# Patient Record
Sex: FEMALE | Race: WHITE | NOT HISPANIC OR LATINO | URBAN - METROPOLITAN AREA
[De-identification: names, ages, dates, MRNs, and addresses within clinical notes are randomized per-mention and may not be internally consistent; named-entity substitution may affect disease eponyms.]

---

## 2017-01-01 ENCOUNTER — HOME CARE/HOSPICE - HEALTHEAST (OUTPATIENT)
Dept: HOSPICE | Facility: HOSPICE | Age: 82
End: 2017-01-01

## 2017-01-01 ENCOUNTER — OFFICE VISIT - HEALTHEAST (OUTPATIENT)
Dept: GERIATRICS | Facility: CLINIC | Age: 82
End: 2017-01-01

## 2017-01-01 ENCOUNTER — AMBULATORY - HEALTHEAST (OUTPATIENT)
Dept: GERIATRICS | Facility: CLINIC | Age: 82
End: 2017-01-01

## 2017-01-01 DIAGNOSIS — F32.A DEPRESSION: ICD-10-CM

## 2017-01-01 DIAGNOSIS — I50.42 CHRONIC COMBINED SYSTOLIC AND DIASTOLIC CONGESTIVE HEART FAILURE (H): ICD-10-CM

## 2017-01-01 DIAGNOSIS — G30.1 LATE ONSET ALZHEIMER'S DISEASE WITH BEHAVIORAL DISTURBANCE (H): ICD-10-CM

## 2017-01-01 DIAGNOSIS — J44.9 CHRONIC AIRWAY OBSTRUCTION (H): ICD-10-CM

## 2017-01-01 DIAGNOSIS — I48.20 CHRONIC ATRIAL FIBRILLATION (H): ICD-10-CM

## 2017-01-01 DIAGNOSIS — I10 ESSENTIAL HYPERTENSION WITH GOAL BLOOD PRESSURE LESS THAN 140/90: ICD-10-CM

## 2017-01-01 DIAGNOSIS — F03.90 DEMENTIA (H): ICD-10-CM

## 2017-01-01 DIAGNOSIS — F02.818 LATE ONSET ALZHEIMER'S DISEASE WITH BEHAVIORAL DISTURBANCE (H): ICD-10-CM

## 2017-01-01 DIAGNOSIS — B35.4 TINEA CORPORIS: ICD-10-CM

## 2017-01-01 DIAGNOSIS — L71.9 ACNE ROSACEA: ICD-10-CM

## 2017-01-01 DIAGNOSIS — R29.6 FALLS: ICD-10-CM

## 2017-01-01 DIAGNOSIS — H40.9 GLAUCOMA, UNSPECIFIED GLAUCOMA, UNSPECIFIED LATERALITY: ICD-10-CM

## 2017-01-01 RX ORDER — LORAZEPAM 0.5 MG/1
0.5 TABLET ORAL 3 TIMES DAILY
Status: SHIPPED | COMMUNITY
Start: 2017-01-01

## 2017-01-01 RX ORDER — LORAZEPAM 0.5 MG/1
0.5-2 TABLET ORAL EVERY 4 HOURS PRN
Status: SHIPPED | COMMUNITY
Start: 2017-01-01

## 2017-01-01 ASSESSMENT — MIFFLIN-ST. JEOR: SCORE: 1019.58

## 2021-05-30 VITALS — WEIGHT: 170.8 LBS

## 2021-05-30 VITALS — WEIGHT: 176.4 LBS

## 2021-05-30 VITALS — WEIGHT: 176 LBS

## 2021-05-30 VITALS — WEIGHT: 171.2 LBS

## 2021-05-31 VITALS — BODY MASS INDEX: 34 KG/M2 | HEIGHT: 58 IN | WEIGHT: 162 LBS

## 2021-05-31 VITALS — WEIGHT: 166.5 LBS

## 2021-05-31 VITALS — WEIGHT: 166.8 LBS

## 2021-05-31 VITALS — WEIGHT: 168.5 LBS

## 2021-05-31 VITALS — WEIGHT: 161.2 LBS

## 2021-06-02 ENCOUNTER — RECORDS - HEALTHEAST (OUTPATIENT)
Dept: ADMINISTRATIVE | Facility: CLINIC | Age: 86
End: 2021-06-02

## 2021-06-08 NOTE — PROGRESS NOTES
"[facility]    Facility:   [facility] [[id number]]   Code Status: DNR/DNI      CHIEF COMPLAINT/REASON FOR VISIT:  Chief Complaint   Patient presents with     Review Of Multiple Medical Conditions       HISTORY:      HPI: Lucía is a 89 y.o. female with advanced dementia residing at [facility]. I met with her today in her room for a routine follow-up. She was alert and pleasant. She was not oriented to self. Nursing report she eats well and ambulates with a walker. She did not understand the word  pain when I asked her but when I asked if anything hurt she responded, \"no\".She was found to have a yeast rash under breast, axilla and abdominal folds that is currently being treated with miconazole nitrate. Nursing report it is not getting worse.    Past Medical History   Diagnosis Date     Atrial fibrillation      CHF (congestive heart failure)      Chronic airway obstruction      Dementia      Depression      Glaucoma      Hyperlipemia      Hypertension              No family history on file.  Social History     Social History     Marital status:      Spouse name: N/A     Number of children: N/A     Years of education: N/A     Occupational History     Retired Nurse      Social History Main Topics     Smoking status: Never Smoker     Smokeless tobacco: Not on file     Alcohol use No     Drug use: No     Sexual activity: Not on file     Other Topics Concern     Not on file     Social History Narrative    Moved from [facility] Memory care Unit, Now in SCU on LTC status  12/2015         Review of Systems  Unable to obtain- Pt not even alert to self  .  Vitals:    01/20/17 0943   BP: 110/58   Pulse: 73   Resp: 22   Temp: 98.6  F (37  C)   Weight: 176 lb (79.8 kg)       Physical Exam   Constitutional: She appears well-nourished.   HENT:   Head: Normocephalic.   Eyes: Conjunctivae are normal.   Neck: Normal range of motion.   Cardiovascular: Normal rate, regular " rhythm and normal heart sounds.    No murmur heard.  Pulmonary/Chest: She has no wheezes. She has no rales.   Abdominal: Soft. Bowel sounds are normal. She exhibits no distension. There is no tenderness.   Musculoskeletal: She exhibits no edema.   Skin: Skin is warm and dry. Rash noted.   Yeast rash under breast, axilla and abdominal folds, currently being treated.   Psychiatric: Her behavior is normal.   Patient pleasant but could not tell me her name and did not follow commands.         LABS:   11/10/16  creatinine 1.22  GFR 41  TSH 3.84 on 10/29/16  BMP WNL    ASSESSMENT:      ICD-10-CM    1. Dementia F03.90    2. Chronic airway obstruction J44.9    3. Chronic combined systolic and diastolic congestive heart failure I50.42    4. Depression F32.9    5. Essential hypertension with goal blood pressure less than 140/90 I10        PLAN:    Patient is doing well. We will continue with the antifungal medication to abdominal fold, under breast and axilla until healed. Resident was alert and not even oriented to herself. Nursing have no concerns and report she eats well. No further changes to care plan.        Electronically signed by: Reba Muro CNP

## 2021-06-08 NOTE — PROGRESS NOTES
[facility]    DATE: 2017    NAME: Lucía Sr  : 1925           MR# [id number]     CODE STATUS:  DNR/DNI      VISIT TYPE: Problem   FACILITY: [facility] [[id number]]    ROOM: 312    PRIMARY CARE PROVIDER: Malia Leroy CNP Phone: [phone] Fax:[phone]    History of Present Illness:   Lucía Sr is a 89 y.o. female with  advanced dementia And hearing loss. She is at best poorly cooperative allowing only minimal examinations and a difficult person to agree to bathing or showering. Today I find her at rest waiting for her lunch. She allows me a very cursory exam although she suspicious of my motivations. Her responses are not pertinent    Past Medical History:  Past Medical History   Diagnosis Date     Atrial fibrillation      CHF (congestive heart failure)      Chronic airway obstruction      Dementia      Depression      Glaucoma      Hyperlipemia      Hypertension        Allergies:  No Known Allergies    Current Medications:  Current Outpatient Prescriptions   Medication Sig     acetaminophen (TYLENOL) 325 MG tablet Take 650 mg by mouth 2 (two) times a day as needed for pain (not to exceed 4,000 mg in 24 hours.).      acetaminophen (TYLENOL) 325 MG tablet Take 650 mg by mouth 2 (two) times a day.     albuterol (PROVENTIL) 2.5 mg /3 mL (0.083 %) nebulizer solution Take 2.5 mg by nebulization 3 (three) times a day as needed for wheezing or shortness of breath.      aspirin 325 MG tablet Take 325 mg by mouth daily.      bisacodyl (DULCOLAX) 10 mg suppository Insert 10 mg into the rectum daily as needed.     citalopram (CELEXA) 20 MG tablet Take 10 mg by mouth daily.      furosemide (LASIX) 20 MG tablet Take 60 mg by mouth every morning.      furosemide (LASIX) 20 MG tablet Take 40 mg by mouth Daily at 1:00pm.      hydrocortisone 1 % cream Apply topically 2 (two) times a day. Apply to rash area along neck BID until healed and then BID PRN      lisinopril (PRINIVIL,ZESTRIL) 20 MG tablet Take 20 mg by mouth daily.      miconazole nitrate (CRITIC-AID AF) 2 % Oint ointment Apply 1 application topically 2 (two) times a day. Aply to breast folds, Axillary, and abdominal folds. Clean well between applications, apply 4x4     potassium chloride SA (K-DUR,KLOR-CON) 20 MEQ tablet Take 20 mEq by mouth daily. May dissolve in small amount of water to dispense     QUEtiapine (SEROQUEL) 25 MG tablet Take 12.5 mg by mouth daily.      traZODone (DESYREL) 50 MG tablet Take 75 mg by mouth bedtime.        Review of Systems:  History obtained from chart review and unobtainable from patient due to mental status  Respiratory ROS: no cough, shortness of breath, or wheezing  Cardiovascular ROS: no chest pain or dyspnea on exertion  Gastrointestinal ROS: no abdominal pain, change in bowel habits, or black or bloody stools  Genito-Urinary ROS: no dysuria, trouble voiding, or hematuria  Musculoskeletal ROS: Nursing services report she just robes and present yourself to the dayroom occasionally  Neurological ROS: no TIA or stroke symptoms  Dermatological ROS: nursing service reports that their ongoing efforts for bathing have disclosed no pressure ulcers          Laboratory:  No results for input(s): INR in the last 72 hours.       Physical Examination:  Visit Vitals     /74     Pulse 68     Temp 98.4  F (36.9  C)     Resp 22     Wt 176 lb 6.4 oz (80 kg)     SpO2 94%     General appearance: alert, appears stated age, cooperative, distracted, fatigued, no distress, moderately obese and slowed mentation  Lungs: clear to auscultation bilaterally  Heart: regular rate and rhythm, S1, S2 normal, no murmur, click, rub or gallop  Abdomen: soft, non-tender; bowel sounds normal; no masses,  no organomegaly  Remainder of the exam was aborted by patients commands    Impression:  Lucía Sr is a 89 y.o. female with Advancing dementia    1. Dementia     2. Chronic airway obstruction      3. Chronic combined systolic and diastolic congestive heart failure     4. Acne rosacea     5. Essential hypertension with goal blood pressure less than 140/90     6. Chronic atrial fibrillation     7. Glaucoma, unspecified glaucoma, unspecified laterality     8. Tinea corporis         Plan: Will continue to approach pieces of her exam as time goes on. She feels safe and comfortable is obviously eating well and I believe well protected from outside influences at this point    Electronically signed by: José Miguel Cabrera Sr., MD

## 2021-06-09 NOTE — PROGRESS NOTES
Date: 17    Name:  Lucía Sr  :  1925  MRN: [id number]  Code Status:  DNR/DNI    Visit Type: Problem Visit     Facility:  [facility] [[id number]]      History of Present Illness:   This is a 89-year-old female here at the diagnosis of dementia with psychosis. She lives in a locked dementia unit here on the third floor. She was admitted secondary to falls with weakness-with  No recent falls noted or reported. Staff reports resident was sent to ER - after an injury to her lower right leg. She obtained a skin tear and did have stitches and was sent back to this unit. Staff reports the area is deep pink and request a  visit to assess the need for further treatment.  Resident does use a wheelchair for mobility. She is hard of hearing and is not a reliable - nor does she answer to many questions.. She does have episodes of anxiety with behaviors. She at times is difficult to redirect. She's followed by psych recovery services.  She's here the diagnosis of hypertension with her systolic pressures and pulses noted below in fair range. Also the diagnosis CHF which has been compensated and COPD that's also been stable. She has treated for skin issues since admission- no reports today of any issues or concerns.        Past Medical History:   Diagnosis Date     Atrial fibrillation      CHF (congestive heart failure)      Chronic airway obstruction      Dementia      Depression      Glaucoma      Hyperlipemia      Hypertension      No past surgical history on file.  No family history on file.  Social History   Substance Use Topics     Smoking status: Never Smoker     Smokeless tobacco: Not on file     Alcohol use No       Medications:  Current Outpatient Prescriptions   Medication Sig Dispense Refill     acetaminophen (TYLENOL) 325 MG tablet Take 650 mg by mouth 2 (two) times a day as needed for pain (not to exceed 4,000 mg in 24 hours.).        acetaminophen (TYLENOL) 325 MG tablet  Take 650 mg by mouth 2 (two) times a day.       albuterol (PROVENTIL) 2.5 mg /3 mL (0.083 %) nebulizer solution Take 2.5 mg by nebulization 3 (three) times a day as needed for wheezing or shortness of breath.        aspirin 325 MG tablet Take 325 mg by mouth daily.        bisacodyl (DULCOLAX) 10 mg suppository Insert 10 mg into the rectum daily as needed.       citalopram (CELEXA) 20 MG tablet Take 10 mg by mouth daily.        furosemide (LASIX) 20 MG tablet Take 60 mg by mouth every morning.        furosemide (LASIX) 20 MG tablet Take 40 mg by mouth daily. At noon       lisinopril (PRINIVIL,ZESTRIL) 20 MG tablet Take 20 mg by mouth daily.        potassium chloride SA (K-DUR,KLOR-CON) 20 MEQ tablet Take 20 mEq by mouth daily. May dissolve in small amount of water to dispense       QUEtiapine (SEROQUEL) 25 MG tablet Take 12.5 mg by mouth daily.        traZODone (DESYREL) 50 MG tablet Take 75 mg by mouth bedtime.        No current facility-administered medications for this visit.          No Known Allergies    Vital Signs:   Blood pressure 118/62 pulse 90 respirations 20 afebrile weight 176.9 pounds    ROS:   Resident denies any uncontrolled pain, is not a good - smiles a lot today. Staff has no reports of any issues or concerns regard to her comfort level.      PHYSICAL EXAM:  General; this is an alert obese female up in her wheelchair in no apparent distress    HEENT:Normocephalic/atraumatic Conjunctivae without erythema nares are clear of any drainage.  Neck: No adenopathy JVD thyromegaly  Resp: Clear No rhonchi wheezing rales  Cardio: Regular rate and rhythm No murmurs appreciated  Abdomen: Soft nontender no masses distention bowel sounds are present.  Extremities: 2+ lower extremity edema fair peripheral pulses  Skin: Right lower leg (outer aspect) large sutured skin tear- 3-4 cm in diameter- with deep pink edges noted.  : N/A  Musculoskeletal: Age-related degenerative joint disease  Psych: alert  oriented to self- strikes out when approached    Labs:   No visits with results within 1 Month(s) from this visit.  Latest known visit with results is:    Lab Requisition on 11/10/2016   Component Date Value Ref Range Status     Creatinine 11/10/2016 1.22* 0.60 - 1.10 mg/dL Final     GFR MDRD Af Amer 11/10/2016 50* >60 mL/min/1.73m2 Final     GFR MDRD Non Af Amer 11/10/2016 41* >60 mL/min/1.73m2 Final        Diagnoses:    ICD-10-CM    1. Laceration T14.8    2. Depression F32.9    3. Dementia F03.90    4. Falls W19.XXXA        Plan: We will order Keflex 500 mg bid for 7 days- give yogurt bid for 2 weeks. Ok for bacitracin to apply to wound area with dressing changes. Call with update PRN.    Electronically signed by: Malia Leroy, DARBY

## 2021-06-09 NOTE — PROGRESS NOTES
[facility]    Facility:   [facility] [[id number]]   Code Status: DNR/DNI      CHIEF COMPLAINT/REASON FOR VISIT:  Chief Complaint   Patient presents with     Review Of Multiple Medical Conditions       HISTORY:      HPI: Lucía is a 89 y.o. female with advanced dementia residing at [facility]. I met with her today in her room for a routine follow-up. She was alert and pleasant. She was not oriented to self. Nursing report she eats well and ambulates with a walker. Nursing took her leg dressing so that I could assess her  wound (see physical exam).      Past Medical History:   Diagnosis Date     Atrial fibrillation      CHF (congestive heart failure)      Chronic airway obstruction      Dementia      Depression      Glaucoma      Hyperlipemia      Hypertension              No family history on file.  Social History     Social History     Marital status:      Spouse name: N/A     Number of children: N/A     Years of education: N/A     Occupational History     Retired Nurse      Social History Main Topics     Smoking status: Never Smoker     Smokeless tobacco: Not on file     Alcohol use No     Drug use: No     Sexual activity: Not on file     Other Topics Concern     Not on file     Social History Narrative    Moved from [facility] Memory care Unit, Now in SCU on LTC status  12/2015         Review of Systems   Constitutional: Negative for appetite change and fever.   HENT: Negative for congestion.    Respiratory: Negative for cough and shortness of breath.    Cardiovascular: Negative for leg swelling.   Gastrointestinal: Negative for constipation and diarrhea.   Psychiatric/Behavioral: Negative for sleep disturbance.     Obtained from nursing and chart  .  Vitals:    03/09/17 0906   BP: 116/68   Pulse: 75   Resp: 16   Temp: 97.8  F (36.6  C)   SpO2: 94%   Weight: 171 lb 3.2 oz (77.7 kg)       Physical Exam   Constitutional: She appears well-nourished.    HENT:   Head: Normocephalic.   Eyes: Conjunctivae are normal.   Neck: Normal range of motion.   Cardiovascular: Normal rate, regular rhythm and normal heart sounds.    No murmur heard.  Pulmonary/Chest: She has no wheezes. She has no rales.   Abdominal: Soft. Bowel sounds are normal. She exhibits no distension. There is no tenderness.   Musculoskeletal: She exhibits no edema.   Skin: Skin is warm and dry. No rash noted.   Sutured c-shaped wound right lateral lower leg. There was some fading redness noted. Nursing tell me she just completed a course of antibiotics over the last 2 days and that the wound is improving. Sutures appear to be under the skin and all of them do not overlap the wound.   Psychiatric: Her behavior is normal.   Patient pleasant but could not tell me her name and did not follow commands.         LABS:   No recent on file    ASSESSMENT:      ICD-10-CM    1. Chronic atrial fibrillation I48.2    2. Dementia F03.90    3. Depression F32.9    4. Essential hypertension with goal blood pressure less than 140/90 I10        PLAN:    Patient is doing well. Continue to monitor leg wound for signs or symptoms of infection such as increased redness, purulent drainage or fevers and notify provider.  Resident was alert and not even oriented to herself. Nursing have no concerns and report she eats well. No further changes to care plan.        Electronically signed by: Reba Muro CNP

## 2021-06-10 NOTE — PROGRESS NOTES
[facility]    Facility:   [facility] [[id number]]   Code Status: DNR/DNI      CHIEF COMPLAINT/REASON FOR VISIT:  Chief Complaint   Patient presents with     Review Of Multiple Medical Conditions       HISTORY:      HPI: Lucía is a 89 y.o. female with advanced dementia residing at [facility]. I met with her today in her room for a routine follow-up. She was alert and screaming out.  She was not oriented to self. Nursing have no concerns. SHe does however have a skin tear on her left forearm covered with tegaderm. Per nursing psych recovery has weaned her off her seroquel but her behaviors have increased. SHe has been more combative and resistance to cares. Psych recovery to reevaluate.  Past Medical History:   Diagnosis Date     Atrial fibrillation      CHF (congestive heart failure)      Chronic airway obstruction      Dementia      Depression      Glaucoma      Hyperlipemia      Hypertension              No family history on file.  Social History     Social History     Marital status:      Spouse name: N/A     Number of children: N/A     Years of education: N/A     Occupational History     Retired Nurse      Social History Main Topics     Smoking status: Never Smoker     Smokeless tobacco: Not on file     Alcohol use No     Drug use: No     Sexual activity: Not on file     Other Topics Concern     Not on file     Social History Narrative    Moved from [facility] Memory care Unit, Now in SCU on LTC status  12/2015         Review of Systems   Constitutional: Negative for appetite change and fever.   HENT: Negative for congestion.    Respiratory: Negative for cough and shortness of breath.    Cardiovascular: Negative for leg swelling.   Gastrointestinal: Negative for constipation and diarrhea.   Psychiatric/Behavioral: Negative for sleep disturbance.     Obtained from nursing and chart  .  Vitals:    04/12/17 0614   BP: 120/84   Pulse: 85   Resp: 20    Temp: 98  F (36.7  C)   SpO2: 93%   Weight: 170 lb 12.8 oz (77.5 kg)       Physical Exam   Constitutional: She appears well-nourished.   HENT:   Head: Normocephalic.   Eyes: Conjunctivae are normal.   Neck: Normal range of motion.   Cardiovascular: Normal rate, regular rhythm and normal heart sounds.    No murmur heard.  Pulmonary/Chest: She has no wheezes. She has no rales.   Abdominal: Soft. Bowel sounds are normal. She exhibits no distension. There is no tenderness.   Musculoskeletal: She exhibits no edema.   Skin: Skin is warm and dry. No rash noted.   Healing scabbed over c-shaped wound right lateral lower leg with some bruising noted around it. SHe also has a skin tear left forearm covered with a  tegaderm   Psychiatric:   Patient alert but not oriented. Increased combativeness and resistance  Reported by nursing. Recently weaned off seroquel         LABS:   No recent on file    ASSESSMENT:      ICD-10-CM    1. Chronic combined systolic and diastolic congestive heart failure I50.42    2. Chronic atrial fibrillation I48.2    3. Dementia F03.90    4. Essential hypertension with goal blood pressure less than 140/90 I10        PLAN:    Patient is doing well overall from a medical standpoiint. . Right Leg wound is healing and scabbed over with no signs of infection.   Resident was alert and not oriented. Nursing have no concerns except for increased combativeness. Psych recovery to reevaluate.       Electronically signed by: Reba Muro CNP

## 2021-06-10 NOTE — PROGRESS NOTES
[facility]    Facility:   [facility] [[id number]]   Code Status: DNR/DNI      CHIEF COMPLAINT/REASON FOR VISIT:  Chief Complaint   Patient presents with     Review Of Multiple Medical Conditions       HISTORY:      HPI: Lucía is a 89 y.o. female with advanced dementia residing at [facility]. I met with her today in her room for a routine follow-up. She was alert but not oriented. Nursing reported she is combative and her Depakote has recently been increased. Psych recovery to follow up in 2 weeks. Right leg wound and left forearm skin tear are both healed.      Past Medical History:   Diagnosis Date     Atrial fibrillation      CHF (congestive heart failure)      Chronic airway obstruction      Dementia      Depression      Glaucoma      Hyperlipemia      Hypertension              No family history on file.  Social History     Social History     Marital status:      Spouse name: N/A     Number of children: N/A     Years of education: N/A     Occupational History     Retired Nurse      Social History Main Topics     Smoking status: Never Smoker     Smokeless tobacco: Not on file     Alcohol use No     Drug use: No     Sexual activity: Not on file     Other Topics Concern     Not on file     Social History Narrative    Moved from [facility] Memory care Unit, Now in SCU on LTC status  12/2015         Review of Systems   Constitutional: Negative for appetite change and fever.   HENT: Negative for congestion.    Respiratory: Negative for cough and shortness of breath.    Cardiovascular: Negative for leg swelling.   Gastrointestinal: Negative for constipation and diarrhea.   Psychiatric/Behavioral: Negative for sleep disturbance.     Obtained from nursing and chart  .  Vitals:    05/05/17 0815   BP: 124/62   Pulse: 89   Resp: 22   Temp: 98.2  F (36.8  C)   SpO2: 90%   Weight: 168 lb 8 oz (76.4 kg)       Physical Exam   Constitutional: She appears  well-nourished.   HENT:   Head: Normocephalic.   Eyes: Conjunctivae are normal.   Neck: Normal range of motion.   Cardiovascular: Normal rate, regular rhythm and normal heart sounds.    No murmur heard.  Pulmonary/Chest: She has no wheezes. She has no rales.   Abdominal: Soft. Bowel sounds are normal. She exhibits no distension. There is no tenderness.   Musculoskeletal: She exhibits no edema.   Neurological: She is alert.   Not oriented   Skin: Skin is warm and dry. No rash noted.   Healed  c-shaped wound right lateral lower leg.  SHe also has a  Healed old skin tear left forearm ANICETO   bruising bilateral arms   Psychiatric:   Patient alert but not oriented. Increased combativeness and resistance  Depakote has been increased by MD         LABS:   No recent on file    ASSESSMENT:      ICD-10-CM    1. Chronic atrial fibrillation I48.2    2. Dementia F03.90    3. Essential hypertension with goal blood pressure less than 140/90 I10    4. Chronic combined systolic and diastolic congestive heart failure I50.42        PLAN:    Patient is doing well overall from a medical standpoiint. . Right Leg wound is healied   Resident was alert but not oriented. Nursing have no concerns except for increased combativeness. Her Depakote was increased to 250 TID  and Psych recovery to follow-up in 2 weeks      Electronically signed by: Reba Muro CNP

## 2021-06-11 NOTE — PROGRESS NOTES
[facility]    DATE: 2017    NAME: Lucía Sr  : 1925           MR# [id number]     CODE STATUS:  DNR/DNI      VISIT TYPE: [facility]  FACILITY: [facility] [[id number]]    ROOM: [id number]    PRIMARY CARE PROVIDER: José Miguel Cabrera MD Phone: [phone] Fax:[phone]    History of Present Illness:   Lucía Sr is a 89 y.o. female with Advanced dementia  Who is sitting comfortably on her new regiment of Depakote and tramadol for nonspecific pain. She is much more alert than before, not as sedated as she was on the major tranquilizers.She still has hearing deficit but is more good-natured about it and does allow me a brief exam today before she waves me off. Review of systems is of course impossible given her cognitive status, but nursing service report she's actually quite pleasant much of the time now.    Past Medical History:  Past Medical History:   Diagnosis Date     Atrial fibrillation      CHF (congestive heart failure)      Chronic airway obstruction      Dementia      Depression      Glaucoma      Hyperlipemia      Hypertension        Allergies:  No Known Allergies    Current Medications:  Current Outpatient Prescriptions   Medication Sig     acetaminophen (TYLENOL) 325 MG tablet Take 650 mg by mouth 2 (two) times a day as needed for pain (not to exceed 4,000 mg in 24 hours.).      acetaminophen (TYLENOL) 325 MG tablet Take 650 mg by mouth 2 (two) times a day.     albuterol (PROVENTIL) 2.5 mg /3 mL (0.083 %) nebulizer solution Take 2.5 mg by nebulization 3 (three) times a day as needed for wheezing or shortness of breath.      aspirin 325 MG tablet Take 325 mg by mouth daily.      bisacodyl (DULCOLAX) 10 mg suppository Insert 10 mg into the rectum daily as needed.     citalopram (CELEXA) 20 MG tablet Take 10 mg by mouth daily.      divalproex (DEPAKOTE SPRINKLE) 125 mg capsule Take 250 mg by mouth 3 (three) times a day.      furosemide (LASIX) 20  MG tablet Take 60 mg by mouth every morning.      furosemide (LASIX) 20 MG tablet Take 40 mg by mouth daily. At noon     lisinopril (PRINIVIL,ZESTRIL) 20 MG tablet Take 20 mg by mouth daily.      miconazole nitrate (CRITIC-AID AF) 2 % Oint ointment Apply topically 2 (two) times a day. Apply to breast folds, axillary bed, abdominal fold,     MULTIVIT WITH CALCIUM,IRON,MIN (TAB A URSZULA ORAL) Take 1 tablet by mouth daily.     potassium chloride SA (K-DUR,KLOR-CON) 20 MEQ tablet Take 20 mEq by mouth daily. May dissolve in small amount of water to dispense     risperiDONE (RISPERDAL) 0.25 MG tablet Take 0.125 mg by mouth 2 (two) times a day.     traMADol (ULTRAM) 50 mg tablet Take 25 mg by mouth every 6 (six) hours as needed for pain. Plus 25 mg 8AM and 4PM scheduled     traZODone (DESYREL) 50 MG tablet Take 75 mg by mouth bedtime.        Review of Systems:  History obtained from chart review and unobtainable from patient due to mental status  Respiratory ROS: no cough, shortness of breath, or wheezing  Cardiovascular ROS: no chest pain or dyspnea on exertion  Gastrointestinal ROS: no abdominal pain, change in bowel habits, or black or bloody stools  Genito-Urinary ROS: no dysuria, trouble voiding, or hematuria  Musculoskeletal ROS: First the chair and observing her environment  Neurological ROS: no TIA or stroke symptoms  Dermatological ROS: no observed skin lesions         Laboratory:  No results for input(s): INR in the last 72 hours.  None    Physical Examination:  /68  Pulse 66  Temp (!) 96.4  F (35.8  C)  Resp 22  Wt 161 lb 3.2 oz (73.1 kg)  SpO2 90%  General appearance: alert, appears stated age, cooperative, distracted, fatigued, no distress, moderately obese and slowed mentation  Neck: no adenopathy, no carotid bruit, no JVD, supple, symmetrical, trachea midline and thyroid not enlarged, symmetric, no tenderness/mass/nodules  Lungs: clear to auscultation bilaterally  Heart: regular rate and rhythm, S1,  S2 normal, no murmur, click, rub or gallop  Abdomen: soft, non-tender; bowel sounds normal; no masses,  no organomegaly and obese  Extremities: no peripheral edema fair peripheral pulses  Pulses: 2+ and symmetric  Skin: no open skin lesions noted on the limited exams that she will allow  Neurologic: Mental status: her hearing loss makes it much more difficult for her to interact productively with her environment but she certainly presents with slowed mental reactions and inappropriate responses as well as word salad speech  Motor:globally diminished but no localized deficits       Impression:  Lucía Sr is a 89 y.o. female with Late onset Alzheimer's and gradual progression, seeming much more comfortable on the combination of Depakote and tramadol    1. Late onset Alzheimer's disease with behavioral disturbance     2. Chronic airway obstruction     3. Acne rosacea     4. Essential hypertension with goal blood pressure less than 140/90     5. Chronic atrial fibrillation     6. Tinea corporis         Plan: Continue medication regimen and observe for medication reactions. So far her improvement with this regimen  Is quite impressive. She no longer shows bouts of anger and resistive activity at least not during the day. She is much more cooperative with the nursing staff generally although she has an edge beyond which you can't be pushed    Electronically signed by: José Miguel Cabrera Sr., MD

## 2021-06-12 NOTE — PROGRESS NOTES
[facility]    Facility:   [facility] [[id number]]   Code Status: DNR/DNI      CHIEF COMPLAINT/REASON FOR VISIT:  Chief Complaint   Patient presents with     Review Of Multiple Medical Conditions     routine visit       HISTORY:      HPI: Lucía is a 89 y.o. female with advanced dementia residing at [facility]. I met with her today in her room for a routine follow-up. She was alert but not oriented. Nursing reported she has been less combative. She recently had her tramadol increased and nursing tells me she is more calm overall.  Her weights have been stable 162-166 pounds over the last few months. I will check a BMP on her due to high doses of lasix.    Past Medical History:   Diagnosis Date     Atrial fibrillation      CHF (congestive heart failure)      Chronic airway obstruction      Dementia      Depression      Glaucoma      Hyperlipemia      Hypertension              No family history on file.  Social History     Social History     Marital status:      Spouse name: N/A     Number of children: N/A     Years of education: N/A     Occupational History     Retired Nurse      Social History Main Topics     Smoking status: Never Smoker     Smokeless tobacco: Not on file     Alcohol use No     Drug use: No     Sexual activity: Not on file     Other Topics Concern     Not on file     Social History Narrative    Moved from [facility] [place] [facility] care Unit, Now in SCU on LTC status  12/2015         Review of Systems   Constitutional: Negative for appetite change and fever.   HENT: Negative for congestion.    Respiratory: Negative for cough and shortness of breath.    Cardiovascular: Negative for leg swelling.   Gastrointestinal: Negative for constipation and diarrhea.   Psychiatric/Behavioral: Negative for sleep disturbance.     Obtained from nursing and chart  .  Vitals:    08/11/17 0847   BP: 116/68   Pulse: 88   Resp: 18   Temp: 98.4  F (36.9  C)   SpO2:  95%   Weight: 166 lb 12.8 oz (75.7 kg)       Physical Exam   Constitutional: She appears well-nourished.   HENT:   Head: Normocephalic.   Eyes: Conjunctivae are normal.   Neck: Normal range of motion.   Cardiovascular: Normal rate, regular rhythm and normal heart sounds.    No murmur heard.  Pulmonary/Chest: She has no wheezes. She has no rales.   Abdominal: Soft. Bowel sounds are normal. She exhibits no distension. There is no tenderness.   Musculoskeletal: She exhibits no edema.   Neurological: She is alert.   Not oriented   Skin: Skin is warm and dry. No rash noted.   Healed  c-shaped wound right lateral lower leg.  SHe also has a  Healed old skin tear left forearm ANICETO   bruising bilateral arms  Fading bruises lower legs. Staff tell me she gets combative at times.    Psychiatric:   Patient alert but not oriented.  combativeness and resistance slightly better          LABS:   No recent on file    ASSESSMENT:      ICD-10-CM    1. Chronic combined systolic and diastolic congestive heart failure I50.42    2. Essential hypertension with goal blood pressure less than 140/90 I10    3. Late onset Alzheimer's disease with behavioral disturbance G30.1     F02.81    4. Chronic atrial fibrillation I48.2        PLAN:    Patient is doing well overall from a medical standpoiint. . Right Leg wound is healed   Resident was alert but not oriented. Nursing have no concerns.  Staff  Report that her behaviors are better overall but she still has moments of lashing out.  Continue to provide a safe and comfortable environmnet.  Check BMP for med monitoring.      Electronically signed by: Reba Muro CNP

## 2021-06-12 NOTE — PROGRESS NOTES
[facility]    Facility:   [facility] [[id number]]   Code Status: DNR/DNI      CHIEF COMPLAINT/REASON FOR VISIT:  Chief Complaint   Patient presents with     Review Of Multiple Medical Conditions       HISTORY:      HPI: Lucía is a 89 y.o. female with advanced dementia residing at [facility]. I met with her today in her room for a routine follow-up. She was alert but not oriented. Nursing reported she has been less combative. She recently had her tramadol increased. Her weights have been stable 162-166 pounds over the last few months    Past Medical History:   Diagnosis Date     Atrial fibrillation      CHF (congestive heart failure)      Chronic airway obstruction      Dementia      Depression      Glaucoma      Hyperlipemia      Hypertension              No family history on file.  Social History     Social History     Marital status:      Spouse name: N/A     Number of children: N/A     Years of education: N/A     Occupational History     Retired Nurse      Social History Main Topics     Smoking status: Never Smoker     Smokeless tobacco: Not on file     Alcohol use No     Drug use: No     Sexual activity: Not on file     Other Topics Concern     Not on file     Social History Narrative    Moved from [facility] Memory care Unit, Now in SCU on LTC status  12/2015         Review of Systems   Constitutional: Negative for appetite change and fever.   HENT: Negative for congestion.    Respiratory: Negative for cough and shortness of breath.    Cardiovascular: Negative for leg swelling.   Gastrointestinal: Negative for constipation and diarrhea.   Psychiatric/Behavioral: Negative for sleep disturbance.     Obtained from nursing and chart  .  Vitals:    07/24/17 2008   BP: 128/64   Pulse: 92   Resp: 20   Temp: 97.4  F (36.3  C)   SpO2: 94%   Weight: 166 lb 8 oz (75.5 kg)       Physical Exam   Constitutional: She appears well-nourished.   HENT:   Head:  Normocephalic.   Eyes: Conjunctivae are normal.   Neck: Normal range of motion.   Cardiovascular: Normal rate, regular rhythm and normal heart sounds.    No murmur heard.  Pulmonary/Chest: She has no wheezes. She has no rales.   Abdominal: Soft. Bowel sounds are normal. She exhibits no distension. There is no tenderness.   Musculoskeletal: She exhibits no edema.   Neurological: She is alert.   Not oriented   Skin: Skin is warm and dry. No rash noted.   Healed  c-shaped wound right lateral lower leg.  SHe also has a  Healed old skin tear left forearm ANICETO   bruising bilateral arms   Psychiatric:   Patient alert but not oriented.  combativeness and resistance slightly better  Depakote and Risperdal have been increased along with tramadol         LABS:   No recent on file    ASSESSMENT:      ICD-10-CM    1. Late onset Alzheimer's disease with behavioral disturbance G30.1     F02.81    2. Depression F32.9    3. Essential hypertension with goal blood pressure less than 140/90 I10    4. Chronic atrial fibrillation I48.2        PLAN:    Patient is doing well overall from a medical standpoiint. . Right Leg wound is healed   Resident was alert but not oriented. Nursing have no concerns.  Psych recovery has been seeing her and per staff her behaviors are better  But she continues to have intermittent combativeness.      Electronically signed by: Reba Muro CNP